# Patient Record
Sex: FEMALE | Race: WHITE | Employment: OTHER | URBAN - METROPOLITAN AREA
[De-identification: names, ages, dates, MRNs, and addresses within clinical notes are randomized per-mention and may not be internally consistent; named-entity substitution may affect disease eponyms.]

---

## 2018-06-20 PROBLEM — L73.9 FOLLICULITIS: Status: ACTIVE | Noted: 2018-06-20

## 2018-06-20 PROBLEM — I10 ESSENTIAL HYPERTENSION: Status: ACTIVE | Noted: 2018-06-20

## 2018-06-20 PROBLEM — E78.5 DYSLIPIDEMIA: Status: ACTIVE | Noted: 2018-06-20

## 2018-06-20 PROBLEM — L03.039 CELLULITIS OF TOE: Status: ACTIVE | Noted: 2018-06-20

## 2018-06-20 PROBLEM — Z76.89 ENCOUNTER TO ESTABLISH CARE WITH NEW DOCTOR: Status: ACTIVE | Noted: 2018-06-20

## 2018-06-20 PROBLEM — L23.9 ALLERGIC DERMATITIS: Status: ACTIVE | Noted: 2018-06-20

## 2018-06-29 PROBLEM — B86 SCABIES: Status: ACTIVE | Noted: 2018-06-29

## 2021-06-27 ENCOUNTER — APPOINTMENT (OUTPATIENT)
Dept: CT IMAGING | Age: 86
End: 2021-06-27
Payer: MEDICARE

## 2021-06-27 ENCOUNTER — HOSPITAL ENCOUNTER (EMERGENCY)
Age: 86
Discharge: ANOTHER HOSPITAL | End: 2021-06-28
Attending: EMERGENCY MEDICINE
Payer: MEDICARE

## 2021-06-27 VITALS
RESPIRATION RATE: 16 BRPM | HEART RATE: 81 BPM | HEIGHT: 60 IN | WEIGHT: 182 LBS | TEMPERATURE: 98.1 F | BODY MASS INDEX: 35.73 KG/M2 | OXYGEN SATURATION: 99 % | SYSTOLIC BLOOD PRESSURE: 148 MMHG | DIASTOLIC BLOOD PRESSURE: 64 MMHG

## 2021-06-27 DIAGNOSIS — S01.01XA LACERATION OF SCALP, INITIAL ENCOUNTER: ICD-10-CM

## 2021-06-27 DIAGNOSIS — S06.5XAA SUBDURAL HEMATOMA: Primary | ICD-10-CM

## 2021-06-27 LAB
ABSOLUTE EOS #: 0.08 K/UL (ref 0–0.44)
ABSOLUTE IMMATURE GRANULOCYTE: 0.04 K/UL (ref 0–0.3)
ABSOLUTE LYMPH #: 1.7 K/UL (ref 1.1–3.7)
ABSOLUTE MONO #: 0.71 K/UL (ref 0.1–1.2)
ANION GAP SERPL CALCULATED.3IONS-SCNC: 12 MMOL/L (ref 9–17)
BASOPHILS # BLD: 0 % (ref 0–2)
BASOPHILS ABSOLUTE: 0.03 K/UL (ref 0–0.2)
BUN BLDV-MCNC: 28 MG/DL (ref 8–23)
BUN/CREAT BLD: 33 (ref 9–20)
CALCIUM SERPL-MCNC: 9.4 MG/DL (ref 8.6–10.4)
CHLORIDE BLD-SCNC: 107 MMOL/L (ref 98–107)
CO2: 23 MMOL/L (ref 20–31)
CREAT SERPL-MCNC: 0.84 MG/DL (ref 0.5–0.9)
DIFFERENTIAL TYPE: ABNORMAL
EOSINOPHILS RELATIVE PERCENT: 1 % (ref 1–4)
GFR AFRICAN AMERICAN: >60 ML/MIN
GFR NON-AFRICAN AMERICAN: >60 ML/MIN
GFR SERPL CREATININE-BSD FRML MDRD: ABNORMAL ML/MIN/{1.73_M2}
GFR SERPL CREATININE-BSD FRML MDRD: ABNORMAL ML/MIN/{1.73_M2}
GLUCOSE BLD-MCNC: 141 MG/DL (ref 70–99)
HCT VFR BLD CALC: 33.4 % (ref 36.3–47.1)
HEMOGLOBIN: 10.7 G/DL (ref 11.9–15.1)
IMMATURE GRANULOCYTES: 1 %
LYMPHOCYTES # BLD: 20 % (ref 24–43)
MCH RBC QN AUTO: 31.9 PG (ref 25.2–33.5)
MCHC RBC AUTO-ENTMCNC: 32 G/DL (ref 28.4–34.8)
MCV RBC AUTO: 99.7 FL (ref 82.6–102.9)
MONOCYTES # BLD: 8 % (ref 3–12)
NRBC AUTOMATED: 0 PER 100 WBC
PDW BLD-RTO: 12.5 % (ref 11.8–14.4)
PLATELET # BLD: 166 K/UL (ref 138–453)
PLATELET ESTIMATE: ABNORMAL
PMV BLD AUTO: 10.8 FL (ref 8.1–13.5)
POTASSIUM SERPL-SCNC: 4.4 MMOL/L (ref 3.7–5.3)
RBC # BLD: 3.35 M/UL (ref 3.95–5.11)
RBC # BLD: ABNORMAL 10*6/UL
SEG NEUTROPHILS: 71 % (ref 36–65)
SEGMENTED NEUTROPHILS ABSOLUTE COUNT: 6.12 K/UL (ref 1.5–8.1)
SODIUM BLD-SCNC: 142 MMOL/L (ref 135–144)
WBC # BLD: 8.7 K/UL (ref 3.5–11.3)
WBC # BLD: ABNORMAL 10*3/UL

## 2021-06-27 PROCEDURE — 12002 RPR S/N/AX/GEN/TRNK2.6-7.5CM: CPT

## 2021-06-27 PROCEDURE — 99283 EMERGENCY DEPT VISIT LOW MDM: CPT

## 2021-06-27 PROCEDURE — 2500000003 HC RX 250 WO HCPCS: Performed by: NURSE PRACTITIONER

## 2021-06-27 PROCEDURE — 80048 BASIC METABOLIC PNL TOTAL CA: CPT

## 2021-06-27 PROCEDURE — 70450 CT HEAD/BRAIN W/O DYE: CPT

## 2021-06-27 PROCEDURE — 72125 CT NECK SPINE W/O DYE: CPT

## 2021-06-27 PROCEDURE — 85025 COMPLETE CBC W/AUTO DIFF WBC: CPT

## 2021-06-27 RX ORDER — LIDOCAINE HYDROCHLORIDE 10 MG/ML
20 INJECTION, SOLUTION INFILTRATION; PERINEURAL ONCE
Status: COMPLETED | OUTPATIENT
Start: 2021-06-27 | End: 2021-06-27

## 2021-06-27 RX ORDER — LORATADINE 10 MG/1
10 CAPSULE, LIQUID FILLED ORAL DAILY
COMMUNITY

## 2021-06-27 RX ORDER — ACETAMINOPHEN 160 MG
2000 TABLET,DISINTEGRATING ORAL DAILY
COMMUNITY

## 2021-06-27 RX ADMIN — LIDOCAINE HYDROCHLORIDE 20 ML: 10 INJECTION, SOLUTION INFILTRATION; PERINEURAL at 22:22

## 2021-06-27 ASSESSMENT — ENCOUNTER SYMPTOMS
SHORTNESS OF BREATH: 0
SORE THROAT: 0
COUGH: 0
SINUS PRESSURE: 0
ABDOMINAL PAIN: 0
VOMITING: 0
DIARRHEA: 0
NAUSEA: 0
COLOR CHANGE: 0
CONSTIPATION: 0
RHINORRHEA: 0
WHEEZING: 0

## 2021-06-27 ASSESSMENT — PAIN SCALES - GENERAL
PAINLEVEL_OUTOF10: 3
PAINLEVEL_OUTOF10: 3

## 2021-06-27 ASSESSMENT — PAIN DESCRIPTION - LOCATION: LOCATION: HEAD

## 2021-06-28 NOTE — ED PROVIDER NOTES
[address] [place] [address] ED  eMERGENCY dEPARTMENT eNCOUnter      Pt Name: Parker Garland  MRN: [id number]  [place] 1934  Date of evaluation: 6/27/2021  Provider: Danelle Markham NP, STEFAN - [id number]       Chief Complaint   Patient presents with   Deejay Carpen    Laceration     head no loc         HISTORY OF PRESENT ILLNESS  (Location/Symptom, Timing/Onset, Context/Setting, Quality, Duration, Modifying Factors, Severity.)   Parker Garland is a 80 y.o. female who presents to the emergency department private vehicle for evaluation of a fall. Patient reports that she was in her room when she lost her balance. She states that she fell backwards and hit an open dresser drawer. There was no loss of consciousness. She has  Not had any nausea or vomiting since injury occurred. She does not currently take any blood thinners. She has a large jagged irregular shaped laceration noted to the posterior scalp. Nursing Notes were reviewed.     ALLERGIES     Duricef [cefadroxil], Lyrica [pregabalin], Percocet [oxycodone-acetaminophen], and Sulfa antibiotics    CURRENT MEDICATIONS       Previous Medications    CHOLECALCIFEROL (VITAMIN D3) 50 MCG (2000 UT) CAPS    Take 2,000 capsules by mouth daily    LISINOPRIL (PRINIVIL;ZESTRIL) 2.5 MG TABLET        LORATADINE (CLARITIN) 10 MG CAPSULE    Take 10 mg by mouth daily    LOVASTATIN (MEVACOR) 40 MG TABLET        PERMETHRIN (ELIMITE) 5 % CREAM    Apply topically as directed, keep 8 hours and rinse in the Am, cn repeat in one week    TRIAMCINOLONE (KENALOG) 0.1 % CREAM           PAST MEDICAL HISTORY         Diagnosis Date    Diabetes ([facility] [address]        SURGICAL HISTORY           Procedure Laterality Date    ANKLE SURGERY Right     BACK SURGERY      CHOLECYSTECTOMY      HIP SURGERY Right     KNEE SURGERY           FAMILY HISTORY           Problem Relation Age of Onset    Heart Disease Brother     Cancer Brother     Cancer Maternal Grandmother      Family Status   Relation Name Status    Brother      MGM  (Not Specified)        SOCIAL HISTORY      reports that she has never smoked. She has never used smokeless tobacco.    REVIEW OF SYSTEMS    (2-9 systems for level 4, 10 or more for level 5)     Review of Systems   Constitutional: Negative for chills, fever and unexpected weight change. HENT: Negative for congestion, rhinorrhea, sinus pressure and sore throat. Respiratory: Negative for cough, shortness of breath and wheezing. Cardiovascular: Negative for chest pain and palpitations. Gastrointestinal: Negative for abdominal pain, constipation, diarrhea, nausea and vomiting. Genitourinary: Negative for dysuria and hematuria. Musculoskeletal: Negative for arthralgias and myalgias. Skin: Positive for wound. Negative for color change and rash. Neurological: Negative for dizziness, weakness and headaches. Hematological: Negative for adenopathy. All other systems reviewed and are negative. Except as noted above the remainder of the review of systems was reviewed and negative. PHYSICAL EXAM    (up to 7 for level 4, 8 or more for level 5)     ED Triage Vitals   BP Temp Temp Source Pulse Resp SpO2 Height Weight   21 2300 21   (!) 105/59 98 °F (36.7 °C) Oral 90 16 97 % 5' (1.524 m) 182 lb (82.6 kg)       Physical Exam  Vitals reviewed. Constitutional:       Appearance: She is well-developed. HENT:      Head: Normocephalic and atraumatic. Eyes:      Conjunctiva/sclera: Conjunctivae normal.      Pupils: Pupils are equal, round, and reactive to light. Cardiovascular:      Rate and Rhythm: Normal rate and regular rhythm. Pulmonary:      Effort: Pulmonary effort is normal. No respiratory distress. Breath sounds: Normal breath sounds. No stridor. Abdominal:      General: Bowel sounds are normal.      Palpations: Abdomen is soft. TISSUES/SKULL:  Scalp laceration is present within the left parietooccipital region. 1. Acute right cerebral convexity subdural hematoma, measuring approximately 7 cm in length, and 5 mm in greatest thickness. Approximately 4 mm of leftward shift of the midline structures is present. 2. No acute intraparenchymal hemorrhage, or acute territorial infarct 3. Left parietooccipital scalp laceration 4. Critical results were called by Dr. Donald Robison to [facility] on 6/27/2021 at 10:45 p.m. hours. Interpretation per the Radiologist below, if available at the time of this note:    CT HEAD WO CONTRAST   Final Result   1. Acute right cerebral convexity subdural hematoma, measuring approximately   7 cm in length, and 5 mm in greatest thickness. Approximately 4 mm of   leftward shift of the midline structures is present. 2. No acute intraparenchymal hemorrhage, or acute territorial infarct   3. Left parietooccipital scalp laceration   4. Critical results were called by Dr. Donald Robison to [facility] on   6/27/2021 at 10:45 p.m. hours. CT CERVICAL SPINE WO CONTRAST    (Results Pending)           LABS:  Labs Reviewed   CBC WITH AUTO DIFFERENTIAL - Abnormal; Notable for the following components:       Result Value    RBC 3.35 (*)     Hemoglobin 10.7 (*)     Hematocrit 33.4 (*)     Seg Neutrophils 71 (*)     Lymphocytes 20 (*)     Immature Granulocytes 1 (*)     All other components within normal limits   BASIC METABOLIC PANEL       All other labs were within normal range or not returned as of this dictation. EMERGENCY DEPARTMENT COURSE and DIFFERENTIAL DIAGNOSIS/MDM:   Vitals:    Vitals:    06/27/21 2031 06/27/21 2300   BP: (!) 105/59 (!) 148/64   Pulse: 90 81   Resp: 16 15   Temp: 98 °F (36.7 °C) 98.1 °F (36.7 °C)   TempSrc:  Oral   SpO2: 97% 99%   Weight: 182 lb (82.6 kg)    Height: 5' (1.524 m)        Medical Decision Making: staples out in 10-14 days.   It is found to have a 7 cm in length  x 5 mm in thickness subdural hematoma with a 4 mm midline shift. The patient will need to be transferred to a trauma facility. The patient requests to leave the hospital.  Case was discussed with Dr. Pati Nesbitt from trauma surgery who accepts the patient   She is neurologically intact and hemodynamically stable. PROCEDURES:  Laceration Repair Procedure Note    Indication: Laceration    Procedure: The patient was placed in the appropriate position and anesthesia around the laceration was obtained by infiltration using 1% Lidocaine without epinephrine. The area was then cleansed with betadine and draped in a sterile fashion. The laceration was closed with staples. There were no additional lacerations requiring repair. The wound area was then dressed with Coban. Total repaired wound length: 5 cm. Other Items: Staple count: 10    The patient tolerated the procedure well. Complications: None        FINAL IMPRESSION      1. Subdural hematoma ([address]    2. Laceration of scalp, initial encounter          DISPOSITION/PLAN   DISPOSITION Decision To Transfer 06/27/2021 11:04:54 PM      PATIENT REFERRED TO:   No follow-up provider specified.     DISCHARGE MEDICATIONS:     New Prescriptions    No medications on file           (Please note that portions of this note were completed with a voice recognition program.  Efforts were made to edit the dictations but occasionally words are mis-transcribed.)    Cecilia Moe NP, APRN - CNP  Certified Nurse Practitioner          STEFAN Garcia CNP  06/27/21 4094